# Patient Record
Sex: FEMALE | URBAN - METROPOLITAN AREA
[De-identification: names, ages, dates, MRNs, and addresses within clinical notes are randomized per-mention and may not be internally consistent; named-entity substitution may affect disease eponyms.]

---

## 2024-05-21 ENCOUNTER — APPOINTMENT (RX ONLY)
Dept: URBAN - METROPOLITAN AREA CLINIC 114 | Facility: CLINIC | Age: 64
Setting detail: DERMATOLOGY
End: 2024-05-21

## 2024-05-21 DIAGNOSIS — D22 MELANOCYTIC NEVI: ICD-10-CM

## 2024-05-21 DIAGNOSIS — L82.0 INFLAMED SEBORRHEIC KERATOSIS: ICD-10-CM

## 2024-05-21 DIAGNOSIS — L81.1 CHLOASMA: ICD-10-CM

## 2024-05-21 DIAGNOSIS — D17 BENIGN LIPOMATOUS NEOPLASM: ICD-10-CM

## 2024-05-21 DIAGNOSIS — B07.8 OTHER VIRAL WARTS: ICD-10-CM

## 2024-05-21 PROBLEM — D17.22 BENIGN LIPOMATOUS NEOPLASM OF SKIN AND SUBCUTANEOUS TISSUE OF LEFT ARM: Status: ACTIVE | Noted: 2024-05-21

## 2024-05-21 PROBLEM — D22.122 MELANOCYTIC NEVI OF LEFT LOWER EYELID, INCLUDING CANTHUS: Status: ACTIVE | Noted: 2024-05-21

## 2024-05-21 PROCEDURE — 17110 DESTRUCTION B9 LES UP TO 14: CPT

## 2024-05-21 PROCEDURE — 99203 OFFICE O/P NEW LOW 30 MIN: CPT | Mod: 25

## 2024-05-21 PROCEDURE — ? TREATMENT REGIMEN

## 2024-05-21 PROCEDURE — ? PRESCRIPTION

## 2024-05-21 PROCEDURE — ? COUNSELING

## 2024-05-21 PROCEDURE — ? LIQUID NITROGEN

## 2024-05-21 RX ORDER — HYDROQUINONE 6 %
THIN FILM EMULSION (GRAM) TOPICAL AS DIRECTED
Qty: 30 | Refills: 1 | Status: ERX | COMMUNITY
Start: 2024-05-21

## 2024-05-21 RX ADMIN — Medication THIN FILM: at 00:00

## 2024-05-21 ASSESSMENT — LOCATION SIMPLE DESCRIPTION DERM
LOCATION SIMPLE: RIGHT CHEEK
LOCATION SIMPLE: NOSE
LOCATION SIMPLE: LEFT FOREARM
LOCATION SIMPLE: SUPERIOR FOREHEAD
LOCATION SIMPLE: LEFT UPPER ARM
LOCATION SIMPLE: LEFT CHEEK
LOCATION SIMPLE: LEFT INFERIOR EYELID
LOCATION SIMPLE: RIGHT ZYGOMA

## 2024-05-21 ASSESSMENT — LOCATION DETAILED DESCRIPTION DERM
LOCATION DETAILED: LEFT PROXIMAL DORSAL FOREARM
LOCATION DETAILED: SUPERIOR MID FOREHEAD
LOCATION DETAILED: RIGHT INFERIOR LATERAL MALAR CHEEK
LOCATION DETAILED: NASAL DORSUM
LOCATION DETAILED: LEFT LATERAL INFERIOR EYELID
LOCATION DETAILED: RIGHT CENTRAL MALAR CHEEK
LOCATION DETAILED: LEFT ANTERIOR DISTAL UPPER ARM
LOCATION DETAILED: RIGHT MEDIAL ZYGOMA
LOCATION DETAILED: LEFT SUPERIOR NASAL CHEEK

## 2024-05-21 ASSESSMENT — LOCATION ZONE DERM
LOCATION ZONE: NOSE
LOCATION ZONE: ARM
LOCATION ZONE: FACE
LOCATION ZONE: EYELID

## 2024-05-21 NOTE — PROCEDURE: COUNSELING
Detail Level: Detailed
Detail Level: Simple
Zinc Supplementation Recommendations: 550mg Zinc sulfate Supplementation Daily = 2.5 x 220mg tablets daily
Topical Salicylic Acid Recommendations: Wart[employer]el prescription

## 2024-05-21 NOTE — PROCEDURE: LIQUID NITROGEN
Render Post-Care Instructions In Note?: yes
Spray Paint Technique: No
Application Tool (Optional): Liquid Nitrogen Sprayer
Post-Care Instructions: Verbally reviewed with the patient in detail post-care instructions. Patient understands if lesion persists 2 weeks after treatment or if it recurs, to contact our office for further evaluation and management.
Spray Paint Text: The liquid nitrogen was applied to the skin utilizing a spray paint frosting technique.
Medical Necessity Clause: This procedure was medically necessary because the lesions that were treated were:
Duration Of Freeze Thaw-Cycle (Seconds): 5-10
Number Of Freeze-Thaw Cycles: 3 freeze-thaw cycles
Detail Level: Detailed
Aperture Size (Optional): C
Medical Necessity Information: It is in your best interest to select a reason for this procedure from the list below. All of these items fulfill various CMS LCD requirements except the new and changing color options.
Consent: Verbal consent was obtained including but not limited to risks of crusting, scabbing, swelling, blistering, scarring, darker or lighter pigmentary change, recurrence, incomplete removal and infection.